# Patient Record
Sex: FEMALE | Race: WHITE | Employment: OTHER | URBAN - NONMETROPOLITAN AREA
[De-identification: names, ages, dates, MRNs, and addresses within clinical notes are randomized per-mention and may not be internally consistent; named-entity substitution may affect disease eponyms.]

---

## 2017-09-28 ENCOUNTER — APPOINTMENT (OUTPATIENT)
Dept: NON INVASIVE DIAGNOSTICS | Age: 72
DRG: 313 | End: 2017-09-28
Attending: ANESTHESIOLOGY
Payer: MEDICARE

## 2017-09-28 ENCOUNTER — HOSPITAL ENCOUNTER (INPATIENT)
Age: 72
LOS: 1 days | Discharge: HOME | DRG: 313 | End: 2017-09-28
Attending: ANESTHESIOLOGY | Admitting: ANESTHESIOLOGY
Payer: MEDICARE

## 2017-09-28 VITALS
OXYGEN SATURATION: 97 % | BODY MASS INDEX: 22.9 KG/M2 | TEMPERATURE: 97.8 F | SYSTOLIC BLOOD PRESSURE: 147 MMHG | DIASTOLIC BLOOD PRESSURE: 66 MMHG | WEIGHT: 113.6 LBS | HEIGHT: 59 IN | HEART RATE: 73 BPM | RESPIRATION RATE: 18 BRPM

## 2017-09-28 PROBLEM — R07.9 CHEST PAIN: Status: ACTIVE | Noted: 2017-09-28

## 2017-09-28 LAB
ALBUMIN SERPL-MCNC: 3.5 G/DL (ref 3.5–5.1)
ALP BLD-CCNC: 80 U/L (ref 38–126)
ALT SERPL-CCNC: 9 U/L (ref 11–66)
ANION GAP SERPL CALCULATED.3IONS-SCNC: 10 MEQ/L (ref 8–16)
APTT: 32.2 SECONDS (ref 22–38)
AST SERPL-CCNC: 13 U/L (ref 5–40)
BASOPHILS # BLD: 0.2 %
BASOPHILS ABSOLUTE: 0 THOU/MM3 (ref 0–0.1)
BILIRUB SERPL-MCNC: 0.3 MG/DL (ref 0.3–1.2)
BUN BLDV-MCNC: 11 MG/DL (ref 7–22)
CALCIUM SERPL-MCNC: 8.6 MG/DL (ref 8.5–10.5)
CHLORIDE BLD-SCNC: 101 MEQ/L (ref 98–111)
CO2: 27 MEQ/L (ref 23–33)
CREAT SERPL-MCNC: 0.4 MG/DL (ref 0.4–1.2)
EOSINOPHIL # BLD: 2.4 %
EOSINOPHILS ABSOLUTE: 0.2 THOU/MM3 (ref 0–0.4)
GFR SERPL CREATININE-BSD FRML MDRD: > 90 ML/MIN/1.73M2
GLUCOSE BLD-MCNC: 116 MG/DL (ref 70–108)
HCT VFR BLD CALC: 35.9 % (ref 37–47)
HEMOGLOBIN: 12.4 GM/DL (ref 12–16)
INR BLD: 0.99 (ref 0.85–1.13)
LACTIC ACID: 1 MMOL/L (ref 0.5–2.2)
LV EF: 60 %
LVEF MODALITY: NORMAL
LYMPHOCYTES # BLD: 27.6 %
LYMPHOCYTES ABSOLUTE: 1.9 THOU/MM3 (ref 1–4.8)
MAGNESIUM: 2.2 MG/DL (ref 1.6–2.4)
MCH RBC QN AUTO: 30.8 PG (ref 27–31)
MCHC RBC AUTO-ENTMCNC: 34.5 GM/DL (ref 33–37)
MCV RBC AUTO: 89.2 FL (ref 81–99)
MONOCYTES # BLD: 7 %
MONOCYTES ABSOLUTE: 0.5 THOU/MM3 (ref 0.4–1.3)
NUCLEATED RED BLOOD CELLS: 0 /100 WBC
PDW BLD-RTO: 13.6 % (ref 11.5–14.5)
PHOSPHORUS: 3.3 MG/DL (ref 2.4–4.7)
PLATELET # BLD: 351 THOU/MM3 (ref 130–400)
PMV BLD AUTO: 7.7 MCM (ref 7.4–10.4)
POTASSIUM SERPL-SCNC: 3.4 MEQ/L (ref 3.5–5.2)
PRO-BNP: 471.1 PG/ML (ref 0–900)
RBC # BLD: 4.03 MILL/MM3 (ref 4.2–5.4)
RBC # BLD: NORMAL 10*6/UL
SEG NEUTROPHILS: 62.8 %
SEGMENTED NEUTROPHILS ABSOLUTE COUNT: 4.3 THOU/MM3 (ref 1.8–7.7)
SODIUM BLD-SCNC: 138 MEQ/L (ref 135–145)
TOTAL PROTEIN: 7.1 G/DL (ref 6.1–8)
TROPONIN T: < 0.01 NG/ML
TROPONIN T: < 0.01 NG/ML
WBC # BLD: 6.8 THOU/MM3 (ref 4.8–10.8)

## 2017-09-28 PROCEDURE — 93306 TTE W/DOPPLER COMPLETE: CPT

## 2017-09-28 PROCEDURE — 2500000003 HC RX 250 WO HCPCS: Performed by: ANESTHESIOLOGY

## 2017-09-28 PROCEDURE — 80053 COMPREHEN METABOLIC PANEL: CPT

## 2017-09-28 PROCEDURE — 6360000002 HC RX W HCPCS: Performed by: ANESTHESIOLOGY

## 2017-09-28 PROCEDURE — 93017 CV STRESS TEST TRACING ONLY: CPT | Performed by: INTERNAL MEDICINE

## 2017-09-28 PROCEDURE — 2580000003 HC RX 258: Performed by: ANESTHESIOLOGY

## 2017-09-28 PROCEDURE — 85610 PROTHROMBIN TIME: CPT

## 2017-09-28 PROCEDURE — 84100 ASSAY OF PHOSPHORUS: CPT

## 2017-09-28 PROCEDURE — 2060000000 HC ICU INTERMEDIATE R&B

## 2017-09-28 PROCEDURE — 83605 ASSAY OF LACTIC ACID: CPT

## 2017-09-28 PROCEDURE — 6370000000 HC RX 637 (ALT 250 FOR IP): Performed by: ANESTHESIOLOGY

## 2017-09-28 PROCEDURE — 83880 ASSAY OF NATRIURETIC PEPTIDE: CPT

## 2017-09-28 PROCEDURE — 84484 ASSAY OF TROPONIN QUANT: CPT

## 2017-09-28 PROCEDURE — 36415 COLL VENOUS BLD VENIPUNCTURE: CPT

## 2017-09-28 PROCEDURE — 6360000002 HC RX W HCPCS

## 2017-09-28 PROCEDURE — 96374 THER/PROPH/DIAG INJ IV PUSH: CPT | Performed by: INTERNAL MEDICINE

## 2017-09-28 PROCEDURE — 93005 ELECTROCARDIOGRAM TRACING: CPT | Performed by: ANESTHESIOLOGY

## 2017-09-28 PROCEDURE — 85025 COMPLETE CBC W/AUTO DIFF WBC: CPT

## 2017-09-28 PROCEDURE — 78452 HT MUSCLE IMAGE SPECT MULT: CPT

## 2017-09-28 PROCEDURE — A9500 TC99M SESTAMIBI: HCPCS | Performed by: HOSPITALIST

## 2017-09-28 PROCEDURE — 99223 1ST HOSP IP/OBS HIGH 75: CPT | Performed by: ANESTHESIOLOGY

## 2017-09-28 PROCEDURE — 85730 THROMBOPLASTIN TIME PARTIAL: CPT

## 2017-09-28 PROCEDURE — 3430000000 HC RX DIAGNOSTIC RADIOPHARMACEUTICAL: Performed by: HOSPITALIST

## 2017-09-28 PROCEDURE — 94640 AIRWAY INHALATION TREATMENT: CPT

## 2017-09-28 PROCEDURE — 99239 HOSP IP/OBS DSCHRG MGMT >30: CPT | Performed by: HOSPITALIST

## 2017-09-28 PROCEDURE — 83735 ASSAY OF MAGNESIUM: CPT

## 2017-09-28 PROCEDURE — 6370000000 HC RX 637 (ALT 250 FOR IP)

## 2017-09-28 RX ORDER — CLOPIDOGREL 300 MG/1
300 TABLET, FILM COATED ORAL ONCE
Status: COMPLETED | OUTPATIENT
Start: 2017-09-28 | End: 2017-09-28

## 2017-09-28 RX ORDER — ONDANSETRON 4 MG/1
4 TABLET, FILM COATED ORAL DAILY PRN
Status: DISCONTINUED | OUTPATIENT
Start: 2017-09-28 | End: 2017-09-28 | Stop reason: HOSPADM

## 2017-09-28 RX ORDER — ONDANSETRON 2 MG/ML
4 INJECTION INTRAMUSCULAR; INTRAVENOUS EVERY 6 HOURS PRN
Status: DISCONTINUED | OUTPATIENT
Start: 2017-09-28 | End: 2017-09-28 | Stop reason: HOSPADM

## 2017-09-28 RX ORDER — HYDROCODONE BITARTRATE AND ACETAMINOPHEN 5; 325 MG/1; MG/1
1 TABLET ORAL EVERY 4 HOURS PRN
Status: DISCONTINUED | OUTPATIENT
Start: 2017-09-28 | End: 2017-09-28 | Stop reason: HOSPADM

## 2017-09-28 RX ORDER — HYDROCHLOROTHIAZIDE 25 MG/1
12.5 TABLET ORAL DAILY
Status: DISCONTINUED | OUTPATIENT
Start: 2017-09-28 | End: 2017-09-28 | Stop reason: HOSPADM

## 2017-09-28 RX ORDER — PRAVASTATIN SODIUM 20 MG
20 TABLET ORAL DAILY
Status: DISCONTINUED | OUTPATIENT
Start: 2017-09-28 | End: 2017-09-28 | Stop reason: HOSPADM

## 2017-09-28 RX ORDER — ASPIRIN 325 MG
325 TABLET ORAL NIGHTLY
Status: DISCONTINUED | OUTPATIENT
Start: 2017-09-28 | End: 2017-09-28 | Stop reason: HOSPADM

## 2017-09-28 RX ORDER — ASPIRIN 325 MG
325 TABLET ORAL NIGHTLY
COMMUNITY
End: 2018-08-06 | Stop reason: ALTCHOICE

## 2017-09-28 RX ORDER — HYDROCHLOROTHIAZIDE 12.5 MG/1
12.5 TABLET ORAL DAILY
COMMUNITY
End: 2018-08-06 | Stop reason: ALTCHOICE

## 2017-09-28 RX ORDER — CLOPIDOGREL BISULFATE 75 MG/1
75 TABLET ORAL DAILY
Status: DISCONTINUED | OUTPATIENT
Start: 2017-09-29 | End: 2017-09-28 | Stop reason: HOSPADM

## 2017-09-28 RX ORDER — MORPHINE SULFATE 2 MG/ML
2 INJECTION, SOLUTION INTRAMUSCULAR; INTRAVENOUS
Status: DISCONTINUED | OUTPATIENT
Start: 2017-09-28 | End: 2017-09-28 | Stop reason: HOSPADM

## 2017-09-28 RX ORDER — METOPROLOL SUCCINATE 100 MG/1
100 TABLET, EXTENDED RELEASE ORAL DAILY
Status: DISCONTINUED | OUTPATIENT
Start: 2017-09-28 | End: 2017-09-28

## 2017-09-28 RX ORDER — METOPROLOL SUCCINATE 50 MG/1
50 TABLET, EXTENDED RELEASE ORAL DAILY
Status: DISCONTINUED | OUTPATIENT
Start: 2017-09-28 | End: 2017-09-28 | Stop reason: HOSPADM

## 2017-09-28 RX ORDER — ZAFIRLUKAST 20 MG/1
20 TABLET, FILM COATED ORAL 2 TIMES DAILY
Status: DISCONTINUED | OUTPATIENT
Start: 2017-09-28 | End: 2017-09-28 | Stop reason: HOSPADM

## 2017-09-28 RX ORDER — AMLODIPINE BESYLATE 5 MG/1
5 TABLET ORAL DAILY
COMMUNITY
End: 2018-08-06 | Stop reason: ALTCHOICE

## 2017-09-28 RX ORDER — SODIUM CHLORIDE 0.9 % (FLUSH) 0.9 %
10 SYRINGE (ML) INJECTION PRN
Status: DISCONTINUED | OUTPATIENT
Start: 2017-09-28 | End: 2017-09-28 | Stop reason: HOSPADM

## 2017-09-28 RX ORDER — NITROGLYCERIN 20 MG/100ML
5 INJECTION INTRAVENOUS CONTINUOUS
Status: DISCONTINUED | OUTPATIENT
Start: 2017-09-28 | End: 2017-09-28 | Stop reason: HOSPADM

## 2017-09-28 RX ORDER — AMLODIPINE BESYLATE 5 MG/1
5 TABLET ORAL DAILY
Status: DISCONTINUED | OUTPATIENT
Start: 2017-09-28 | End: 2017-09-28 | Stop reason: HOSPADM

## 2017-09-28 RX ORDER — ASCORBIC ACID 500 MG
500 TABLET ORAL DAILY
Status: DISCONTINUED | OUTPATIENT
Start: 2017-09-28 | End: 2017-09-28 | Stop reason: HOSPADM

## 2017-09-28 RX ORDER — NITROGLYCERIN 0.4 MG/1
0.4 TABLET SUBLINGUAL EVERY 5 MIN PRN
Status: DISCONTINUED | OUTPATIENT
Start: 2017-09-28 | End: 2017-09-28 | Stop reason: HOSPADM

## 2017-09-28 RX ORDER — BUDESONIDE AND FORMOTEROL FUMARATE DIHYDRATE 80; 4.5 UG/1; UG/1
1 AEROSOL RESPIRATORY (INHALATION) 2 TIMES DAILY
COMMUNITY
End: 2018-08-06 | Stop reason: ALTCHOICE

## 2017-09-28 RX ORDER — ALBUTEROL SULFATE 2.5 MG/3ML
2.5 SOLUTION RESPIRATORY (INHALATION) EVERY 6 HOURS PRN
Status: DISCONTINUED | OUTPATIENT
Start: 2017-09-28 | End: 2017-09-28 | Stop reason: HOSPADM

## 2017-09-28 RX ORDER — MORPHINE SULFATE 4 MG/ML
4 INJECTION, SOLUTION INTRAMUSCULAR; INTRAVENOUS
Status: DISCONTINUED | OUTPATIENT
Start: 2017-09-28 | End: 2017-09-28 | Stop reason: HOSPADM

## 2017-09-28 RX ORDER — ALBUTEROL SULFATE 90 UG/1
2 AEROSOL, METERED RESPIRATORY (INHALATION) EVERY 6 HOURS PRN
Status: DISCONTINUED | OUTPATIENT
Start: 2017-09-28 | End: 2017-09-28 | Stop reason: HOSPADM

## 2017-09-28 RX ORDER — POTASSIUM CHLORIDE 750 MG/1
40 TABLET, FILM COATED, EXTENDED RELEASE ORAL ONCE
Status: COMPLETED | OUTPATIENT
Start: 2017-09-28 | End: 2017-09-28

## 2017-09-28 RX ORDER — FAMOTIDINE 20 MG/1
20 TABLET, FILM COATED ORAL 2 TIMES DAILY
Status: DISCONTINUED | OUTPATIENT
Start: 2017-09-28 | End: 2017-09-28 | Stop reason: HOSPADM

## 2017-09-28 RX ORDER — SODIUM CHLORIDE 0.9 % (FLUSH) 0.9 %
10 SYRINGE (ML) INJECTION EVERY 12 HOURS SCHEDULED
Status: DISCONTINUED | OUTPATIENT
Start: 2017-09-28 | End: 2017-09-28 | Stop reason: HOSPADM

## 2017-09-28 RX ORDER — CYCLOBENZAPRINE HCL 10 MG
5 TABLET ORAL 3 TIMES DAILY PRN
Status: DISCONTINUED | OUTPATIENT
Start: 2017-09-28 | End: 2017-09-28 | Stop reason: HOSPADM

## 2017-09-28 RX ORDER — HYDROCODONE BITARTRATE AND ACETAMINOPHEN 5; 325 MG/1; MG/1
2 TABLET ORAL EVERY 4 HOURS PRN
Status: DISCONTINUED | OUTPATIENT
Start: 2017-09-28 | End: 2017-09-28 | Stop reason: HOSPADM

## 2017-09-28 RX ORDER — ACETAMINOPHEN 325 MG/1
650 TABLET ORAL EVERY 4 HOURS PRN
Status: DISCONTINUED | OUTPATIENT
Start: 2017-09-28 | End: 2017-09-28 | Stop reason: HOSPADM

## 2017-09-28 RX ORDER — ALPRAZOLAM 0.5 MG/1
0.5 TABLET ORAL EVERY 8 HOURS PRN
Status: DISCONTINUED | OUTPATIENT
Start: 2017-09-28 | End: 2017-09-28 | Stop reason: HOSPADM

## 2017-09-28 RX ADMIN — NITROGLYCERIN 5 MCG/MIN: 20 INJECTION INTRAVENOUS at 03:22

## 2017-09-28 RX ADMIN — FAMOTIDINE 20 MG: 20 TABLET, FILM COATED ORAL at 10:39

## 2017-09-28 RX ADMIN — ZAFIRLUKAST 20 MG: 20 TABLET, FILM COATED ORAL at 11:36

## 2017-09-28 RX ADMIN — ENOXAPARIN SODIUM 50 MG: 60 INJECTION SUBCUTANEOUS at 06:14

## 2017-09-28 RX ADMIN — POTASSIUM CHLORIDE 40 MEQ: 750 TABLET, FILM COATED, EXTENDED RELEASE ORAL at 10:39

## 2017-09-28 RX ADMIN — ALBUTEROL SULFATE 2.5 MG: 2.5 SOLUTION RESPIRATORY (INHALATION) at 06:37

## 2017-09-28 RX ADMIN — Medication 10 ML: at 10:41

## 2017-09-28 RX ADMIN — Medication 10 MILLICURIE: at 07:30

## 2017-09-28 RX ADMIN — METOPROLOL SUCCINATE 50 MG: 50 TABLET, FILM COATED, EXTENDED RELEASE ORAL at 10:39

## 2017-09-28 RX ADMIN — AMLODIPINE BESYLATE 5 MG: 5 TABLET ORAL at 10:39

## 2017-09-28 RX ADMIN — HYDROCHLOROTHIAZIDE 12.5 MG: 25 TABLET ORAL at 10:40

## 2017-09-28 RX ADMIN — CLOPIDOGREL BISULFATE 300 MG: 300 TABLET, FILM COATED ORAL at 06:14

## 2017-09-28 RX ADMIN — Medication 34.7 MILLICURIE: at 09:03

## 2017-09-28 RX ADMIN — Medication 500 MG: at 10:39

## 2017-09-28 RX ADMIN — VITAMIN D, TAB 1000IU (100/BT) 1000 UNITS: 25 TAB at 10:39

## 2017-09-28 ASSESSMENT — PAIN SCALES - GENERAL
PAINLEVEL_OUTOF10: 5
PAINLEVEL_OUTOF10: 4
PAINLEVEL_OUTOF10: 5

## 2017-09-28 ASSESSMENT — PAIN DESCRIPTION - PAIN TYPE
TYPE: ACUTE PAIN

## 2017-09-28 ASSESSMENT — PAIN DESCRIPTION - LOCATION
LOCATION: CHEST
LOCATION: CHEST

## 2017-09-28 ASSESSMENT — PAIN DESCRIPTION - ORIENTATION: ORIENTATION: MID

## 2017-09-28 NOTE — PROGRESS NOTES
0015 Patient arrived to [facility] via stretcher with belongings and home medications. Patient complains of dizziness, chest pain, and SOB with exertion. 0.9 saline infusing at [facility] and nitro gtt running at 4mcg/min. Vitals and assessment completed. Peaked T-wave noted on admission. Dr. Opal Carrillo notified and updated. Ordered for EKG. Chest pain is 5/10 right now.

## 2017-09-28 NOTE — IP AVS SNAPSHOT
Name Address Phone       [address] [phone]            Your Visit    Here you will find information about your visit, including the reason for your visit. Please take this sheet with you when you visit your doctor or other health care provider in the future. It will help determine the best possible medical care for you at that time. If you have any questions once you leave the hospital, please call the department phone number listed below. Why you were here     Your primary diagnosis was:  Chest Pain      Visit Information     Date & Time Provider Department Dept. Phone    9/28/2017 Cm Garrison MD [facility] [phone]       Follow-up Appointments    Below is a list of your follow-up and future appointments. This may not be a complete list as you may have made appointments directly with providers that we are not aware of or your providers may have made some for you. Please call your providers to confirm appointments. It is important to keep your appointments. Please bring your current insurance card, photo ID, co-pay, and all medication bottles to your appointment. If self-pay, payment is expected at the time of service. Follow-up Information     Follow up with Alice Curiel MD On 10/3/2017. Specialty:  Internal Medicine    Why:  at 9:50am    Contact information:    [address]  [phone]        Preventive Care        Date Due    Hepatitis C screening is recommended for all adults regardless of risk factors born between [facility] at least once (lifetime) who have never been tested.  1945    Tetanus Combination Vaccine (1 - Tdap) 10/21/1964    Cholesterol Screening 10/21/1985    Mammograms are recommended every 2 years for low/average risk patients aged 48 - 69, and every year for high risk patients per updated national guidelines. However these guidelines can be individualized by your provider. 10/21/1995    Colonoscopy 10/21/1995    Zoster Vaccine 10/21/2005    Osteoporosis screening or a bone density scan (Dexa) is recommended once at age 72. Based upon the results and risk factors for bone loss, your provider will recommend whether this needs to be repeated. 10/21/2010    Pneumococcal Vaccines (two) for all adults aged 72 and over (1 of 2 - PCV13) 10/21/2010    Yearly Flu Vaccine (1) 9/1/2017                 Care Plan Once You Return Home    This section includes instructions you will need to follow once you leave the hospital.  Your care team will discuss these with you, so you and those caring for you know how to best care for your health needs at home. This section may also include educational information about certain health topics that may be of help to you. [employer] Signup     Recommendi allows you to send messages to your doctor, view your test results, renew your prescriptions, schedule appointments, view visit notes, and more. How Do I Sign Up? 1. In your Internet browser, go to [email or web address]  2. Click on the Sign Up Now link in the Sign In box. You will see the New Member Sign Up page. 3. Enter your Recommendi Access Code exactly as it appears below. You will not need to use this code after youve completed the sign-up process. If you do not sign up before the expiration date, you must request a new code. Recommendi Access Code: [email or web address]  Expires: 11/27/2017  3:40 PM    4. Enter your Social Security Number ([id number]) and Date of Birth (mm/dd/yyyy) as indicated and click Submit. You will be taken to the next sign-up page. 5. Create a Recommendi ID. This will be your Recommendi login ID and cannot be changed, so think of one that is secure and easy to remember. 6. Create a Recommendi password. You can change your password at any time.

## 2017-09-28 NOTE — H&P
with  respiratory effort. No use of accessory muscles of respiration. No  respiratory distress. The patient can speak in complete sentences. LUNGS:  Clear to auscultation in all fields. No rales, rhonchi,  wheezing, or crackles. HEART:  PMI is displaced to the left of left  midclavicular line, slightly enlarged. Normal S1. Normal S2. No S3. No S4. No murmurs, gallops, rubs, or clicks heard. ABDOMEN:  Soft. Nondistended. Nontender. No peritoneal signs. No masses felt. Bowel sounds normoactive in all four quadrants. BACK:  No spinal tenderness but some neck tenderness. As stated  before, the pain from the chest is radiating up to back of the neck. No CVA tenderness bilaterally though. EXTREMITIES:  No cyanosis, clubbing, or edema. Radial pulses are  4+/4+ bilaterally. Pedal pulses are 2+/4+ bilaterally. No ankle  edema. No pedal edema. No calf tenderness. No Homans' sign. No  cords felt. No evidence of DVT. NEURO:  Alert and oriented x3. GCS 15. Cranial nerves II-XII are  intact. Motor is 5/5 in all extremities and all muscle groups. Sensory exam is symmetrical and within normal limits. SKIN:  Skin is warm and dry without erythema, without rash. There are  some bruises on the anterior left thigh, left knee, and left calf from  working on the ladder decorating the house. No diaphoresis. No rash. No erythema. PSYCHIATRIC:  Normal mood and affect. Normal thought processes. EKG shows sinus bradycardia with sinus arrhythmia, otherwise, a normal  EKG. No ST-T wave changes indicative of any ischemia. LABORATORY DATA:  CBC showed white blood cell count of 6.8 with a  normal differential.  H and H are down a little bit at 12.2 and 37.6. RBC indices are within normal limits. RDW within normal limits. Platelet count is 091. CMP shows albumin level to be 4.0. Alkaline  phosphatase to be 91. Total bilirubin 0.2. BUN to be 12.   Creatinine  to be less than 0.5 with estimated GFR greater than 60. Calcium level  8.7. Glucose level 112. Total protein 7.3. AST is 15. ALT is 11. Chloride is 100. Bicarb 24.7 which is down a little bit. Potassium  level of 3.8. Sodium level of 137. D-dimer assay is negative at  0.36. Troponin T is less than 0.01, which is negative. ASSESSMENT AND PLAN:  1. Chest pain:  There is very much typical of cardiac chest pain, but  she has not bumped her troponins yet. We will run a series of  troponins to check to make sure she did not bump. If she does not  bump, we will get a Lexiscan on her later this morning to find out  what is going on. Chest pain seems to be relieved with nitroglycerin  drip. We will keep using the nitroglycerin drip titrated to chest  pain free. We will continue her aspirin 325 mg or Plavix, we will put  her on with a loading dose and high dose enoxaparin. 2.  GI prophylaxis with Pepcid 20 mg twice daily. 3.  High blood pressure. Continue her hydrochlorothiazide and her  Norvasc and her metoprolol. 4.  Continue her metoprolol for heart. 5.  I have put her on Briscoe for asthma to help out better. 6.  Hyperlipidemia. Continue her pravastatin. 7.  Continue her zafirlukast or Accolade for her asthma. 8.  Continue her vitamin D and her vitamin C for supplementation and  put her on electrolyte replacement protocols. I spent 70 minutes evaluating and managing the patient including  review of records, documentation, and examination of the patient. This 70 minutes of hospitalist time not including time spent  performing procedures as none were performed.         Bryanna Correa M.D.    D: 09/28/2017 4:09:06       T: 09/28/2017 4:19:45     [email or web address]  Job#: [id number]     Doc#: [id number]

## 2017-09-28 NOTE — FLOWSHEET NOTE
Advanced Directives document and information given. Prayer was appreciated. 09/28/17 1504   Encounter Summary   Services provided to: Patient   Referral/Consult From: [address] [facility] Family members   Continue Visiting Yes  (9/28 )   Complexity of Encounter Low   Length of Encounter 15 minutes   Routine   Type Initial   Assessment Approachable;Calm; Hopeful   Intervention [place];Prayer;Nurtured hope; Active listening   Outcome Acceptance;Expressed gratitude;Encouraged; Hopeful;Receptive

## 2017-09-28 NOTE — DISCHARGE SUMMARY
Discharge Summary    Patient:  Kathy Peraza  YOB: 1945    MRN: [id number]   Acct: [de-identified]    Primary Care Physician: Pau Pillai MD    Admit date:  9/28/2017    Discharge date:   9/28/2017     Discharge Diagnoses:   Chest pain  Principal Problem:    Chest pain      Hospital Course:  70year old admitted from [address] [place] [address] for chest pain partially relieved with Nitro. She had negative serial troponins and was monitored overnight. She had a negative lexiscan test and is to follow up with her PCP for echo results. She does admit to a history of GERD usually with her bronchitis episodes and admits she has been hoarse lately. She was recommended for PPI trial with her PCP. Consultants:  Patient Care Team:  Deejay Castellon MD as PCP - General    Discharge Medications:     Medication List      CONTINUE taking these medications          * albuterol (2.5 MG/3ML) 0.083% nebulizer solution   Commonly known as:  PROVENTIL       * albuterol sulfate  (90 Base) MCG/ACT inhaler       ALPRAZolam 0.5 MG tablet   Commonly known as:  XANAX       amLODIPine 5 MG tablet   Commonly known as:  NORVASC       aspirin 325 MG tablet       hydrochlorothiazide 12.5 MG tablet   Commonly known as:  HYDRODIURIL       metoprolol succinate 50 MG extended release tablet   Commonly known as:  TOPROL XL       SYMBICORT 80-4.5 MCG/ACT Aero   Generic drug:  budesonide-formoterol       vitamin C 250 MG tablet       Vitamin D 1000 units Caps capsule   Commonly known as:  CHOLECALCIFEROL       zafirlukast 20 MG tablet   Commonly known as:  ACCOLATE       * Notice: This list has 2 medication(s) that are the same as other medications prescribed for you. Read the directions carefully, and ask your doctor or other care provider to review them with you.       STOP taking these medications          pravastatin 20 MG tablet   Commonly known as:  PRAVACHOL               Physical Exam:    Vitals:  Vitals:    09/28/17 0030 09/28/17 0321 09/28/17 1029 09/28/17 1114   BP: (!) 136/55 (!) 113/54 (!) 144/65 (!) 147/66   Pulse: 71 61 72 73   Resp: 20 16 20 18   Temp: 98.7 °F (37.1 °C) 97.6 °F (36.4 °C) 98.2 °F (36.8 °C) 97.8 °F (36.6 °C)   TempSrc: Oral Oral Oral Oral   SpO2: 97% 95% 96% 97%   Weight: 113 lb 9.6 oz (51.5 kg)      Height: 4' 11\" (1.499 m)        Weight: Weight: 113 lb 9.6 oz (51.5 kg)     24 hour intake/output:  Intake/Output Summary (Last 24 hours) at 09/28/17 1338  Last data filed at 09/28/17 1258   Gross per 24 hour   Intake              440 ml   Output                0 ml   Net              440 ml       General appearance - alert awake appears to be in no acute distress  Chest - Bilateral air entry, no wheeze  Heart - S1S2 RRR, no murmurs or gallops  Abdomen - soft, non tender, normoactive bowel sounds  Neurological - non focal  Extremities - no edema  Skin - no rashes or lesions    Procedures:  na    Diagnostic Test:  Nuclear stress test    Radiology reports as per the Radiologist  Radiology:  No results found.     Results for orders placed or performed during the hospital encounter of 09/28/17   Comprehensive metabolic panel   Result Value Ref Range    Glucose 116 (H) 70 - 108 mg/dL    CREATININE 0.4 0.4 - 1.2 mg/dL    BUN 11 7 - 22 mg/dL    Sodium 138 135 - 145 meq/L    Potassium 3.4 (L) 3.5 - 5.2 meq/L    Chloride 101 98 - 111 meq/L    CO2 27 23 - 33 meq/L    Calcium 8.6 8.5 - 10.5 mg/dL    AST 13 5 - 40 U/L    Alkaline Phosphatase 80 38 - 126 U/L    Total Protein 7.1 6.1 - 8.0 g/dL    Alb 3.5 3.5 - 5.1 g/dL    Total Bilirubin 0.3 0.3 - 1.2 mg/dL    ALT 9 (L) 11 - 66 U/L   Lactic acid, plasma   Result Value Ref Range    Lactic Acid 1.0 0.5 - 2.2 mmol/L   Magnesium   Result Value Ref Range    Magnesium 2.2 1.6 - 2.4 mg/dL   Troponin   Result Value Ref Range    Troponin T < 0.010 ng/ml   Troponin   Result Value Ref Range    Troponin T < 0.010 ng/ml   Brain natriuretic peptide Result Value Ref Range    Pro-.1 0.0 - 900.0 pg/mL   CBC auto differential   Result Value Ref Range    WBC 6.8 4.8 - 10.8 thou/mm3    RBC 4.03 (L) 4.20 - 5.40 mill/mm3    Hemoglobin 12.4 12.0 - 16.0 gm/dl    Hematocrit 35.9 (L) 37.0 - 47.0 %    MCV 89.2 81.0 - 99.0 fL    MCH 30.8 27.0 - 31.0 pg    MCHC 34.5 33.0 - 37.0 gm/dl    RDW 13.6 11.5 - 14.5 %    Platelets 669 222 - 236 thou/mm3    MPV 7.7 7.4 - 10.4 mcm    RBC Morphology NORMAL     Seg Neutrophils 62.8 %    Lymphocytes 27.6 %    Monocytes 7.0 %    Eosinophils 2.4 %    Basophils 0.2 %    nRBC 0 /100 wbc    Segs Absolute 4.3 1.8 - 7.7 thou/mm3    Lymphocytes # 1.9 1.0 - 4.8 thou/mm3    Monocytes # 0.5 0.4 - 1.3 thou/mm3    Eosinophils # 0.2 0.0 - 0.4 thou/mm3    Basophils # 0.0 0.0 - 0.1 thou/mm3   Protime-INR   Result Value Ref Range    INR 0.99 0.85 - 1.13   APTT   Result Value Ref Range    aPTT 32.2 22.0 - 38.0 seconds   Phosphorus   Result Value Ref Range    Phosphorus 3.3 2.4 - 4.7 mg/dL   Anion Gap   Result Value Ref Range    Anion Gap 10.0 8.0 - 16.0 meq/L   Glomerular Filtration Rate, Estimated   Result Value Ref Range    Est, Glom Filt Rate >90 ml/min/1.73m2   EKG 12 Lead   Result Value Ref Range    Ventricular Rate 55 BPM    Atrial Rate 55 BPM    P-R Interval 140 ms    QRS Duration 86 ms    Q-T Interval 440 ms    QTc Calculation (Bazett) 420 ms    P Axis 78 degrees    R Axis -8 degrees    T Axis 67 degrees       Diet:  DIET CARDIAC;     Activity:  Activity as tolerated (Patient may move about with assist as indicated or with supervision.)    Follow-up:  in the next few days with Pau Pillai MD    Disposition: home    Condition: Stable      Time Spent: 35 minutes    Electronically signed by Brayan Tirado MD on 9/28/2017 at 1:38 PM    Discharging Hospitalist

## 2017-09-28 NOTE — PROGRESS NOTES
Pharmacy Medication History Note      List of current medications patient is taking is complete. Source of information: Patient and RxNT    Changes made to medication list:  Medications removed (include reason, ex. therapy complete or physician discontinued): · Flexeril (therapy completed)  · Advair (alternate therapy)  · Vicodin (therapy completed)  · Zofran (therapy completed)  · Pravastatin (patient choice)    Medications added/doses adjusted:  · Added Symbicort 80-4,5 mcg/act 1 puff BID  · Changed Vitamin D 1000 units 5 times daily to Vitamin D 1000 units daily    Other notes (ex. Recent course of antibiotics, Coumadin dosing):  · Denies use of other OTC or herbal medications.       Allergies reviewed      Electronically signed by Vicky Palmer, [facility] on 9/28/2017 at 11:29 AM

## 2017-09-28 NOTE — PROGRESS NOTES
CLINICAL PHARMACY: DISCHARGE MED RECONCILIATION/REVIEW    [facility] ([facility] Select Patient?: No  Total # of Interventions Recommended: 0   - Increased Dose #: 0  Total # Interventions Accepted: 0  Intervention Severity:   - Level 1 Intervention Present?: No   - Level 2 #: 0   - Level 3 #: 0   Time Spent (min): 5    Simeon Lama PharmD  9/28/2017 3:15 PM

## 2017-09-28 NOTE — PROGRESS NOTES
Discharge instructions reviewed with patient and patient verbalizes understanding. Follow up appointments and current medications reviewed with patient and she verbalizes understanding. Telemetry monitor and wires removed and cleansed. Telemetry monitor and wires placed at nurses station in [facility]. Patient discharged with personal belongings. Patient taken to main lobby for discharge.

## 2017-09-29 LAB
EKG ATRIAL RATE: 55 BPM
EKG P AXIS: 78 DEGREES
EKG P-R INTERVAL: 140 MS
EKG Q-T INTERVAL: 440 MS
EKG QRS DURATION: 86 MS
EKG QTC CALCULATION (BAZETT): 420 MS
EKG R AXIS: -8 DEGREES
EKG T AXIS: 67 DEGREES
EKG VENTRICULAR RATE: 55 BPM

## 2018-08-13 ENCOUNTER — ANESTHESIA EVENT (OUTPATIENT)
Dept: OPERATING ROOM | Age: 73
End: 2018-08-13
Payer: MEDICARE

## 2018-08-14 ENCOUNTER — ANESTHESIA (OUTPATIENT)
Dept: OPERATING ROOM | Age: 73
End: 2018-08-14
Payer: MEDICARE

## 2018-08-14 ENCOUNTER — HOSPITAL ENCOUNTER (OUTPATIENT)
Age: 73
Setting detail: OUTPATIENT SURGERY
Discharge: HOME | End: 2018-08-14
Attending: OPHTHALMOLOGY | Admitting: OPHTHALMOLOGY
Payer: MEDICARE

## 2018-08-14 VITALS
BODY MASS INDEX: 19.76 KG/M2 | OXYGEN SATURATION: 94 % | RESPIRATION RATE: 16 BRPM | TEMPERATURE: 97.5 F | HEART RATE: 80 BPM | DIASTOLIC BLOOD PRESSURE: 48 MMHG | HEIGHT: 59 IN | SYSTOLIC BLOOD PRESSURE: 114 MMHG | WEIGHT: 98 LBS

## 2018-08-14 VITALS — OXYGEN SATURATION: 99 % | DIASTOLIC BLOOD PRESSURE: 52 MMHG | SYSTOLIC BLOOD PRESSURE: 114 MMHG

## 2018-08-14 PROBLEM — H43.821 VITREOMACULAR TRACTION SYNDROME OF RIGHT EYE: Status: ACTIVE | Noted: 2018-08-14

## 2018-08-14 PROBLEM — H35.81 MACULAR EDEMA: Status: ACTIVE | Noted: 2018-08-14

## 2018-08-14 LAB
GFR NON-AFRICAN AMERICAN: >60 ML/MIN
GFR SERPL CREATININE-BSD FRML MDRD: >60 ML/MIN
GFR SERPL CREATININE-BSD FRML MDRD: NORMAL ML/MIN/{1.73_M2}
GLUCOSE BLD-MCNC: 94 MG/DL (ref 74–100)
POC CREATININE: 0.86 MG/DL (ref 0.51–1.19)
POC POTASSIUM: 4.1 MMOL/L (ref 3.5–4.5)

## 2018-08-14 PROCEDURE — 2709999900 HC NON-CHARGEABLE SUPPLY: Performed by: OPHTHALMOLOGY

## 2018-08-14 PROCEDURE — 2500000003 HC RX 250 WO HCPCS: Performed by: OPHTHALMOLOGY

## 2018-08-14 PROCEDURE — 3700000001 HC ADD 15 MINUTES (ANESTHESIA): Performed by: OPHTHALMOLOGY

## 2018-08-14 PROCEDURE — 7100000040 HC SPAR PHASE II RECOVERY - FIRST 15 MIN: Performed by: OPHTHALMOLOGY

## 2018-08-14 PROCEDURE — 2580000003 HC RX 258: Performed by: OPHTHALMOLOGY

## 2018-08-14 PROCEDURE — 2580000003 HC RX 258: Performed by: ANESTHESIOLOGY

## 2018-08-14 PROCEDURE — 3600000014 HC SURGERY LEVEL 4 ADDTL 15MIN: Performed by: OPHTHALMOLOGY

## 2018-08-14 PROCEDURE — 6370000000 HC RX 637 (ALT 250 FOR IP): Performed by: OPHTHALMOLOGY

## 2018-08-14 PROCEDURE — 3600000004 HC SURGERY LEVEL 4 BASE: Performed by: OPHTHALMOLOGY

## 2018-08-14 PROCEDURE — 82565 ASSAY OF CREATININE: CPT

## 2018-08-14 PROCEDURE — 6360000002 HC RX W HCPCS: Performed by: OPHTHALMOLOGY

## 2018-08-14 PROCEDURE — 6360000002 HC RX W HCPCS: Performed by: NURSE ANESTHETIST, CERTIFIED REGISTERED

## 2018-08-14 PROCEDURE — 7100000041 HC SPAR PHASE II RECOVERY - ADDTL 15 MIN: Performed by: OPHTHALMOLOGY

## 2018-08-14 PROCEDURE — 6370000000 HC RX 637 (ALT 250 FOR IP): Performed by: ANESTHESIOLOGY

## 2018-08-14 PROCEDURE — 3700000000 HC ANESTHESIA ATTENDED CARE: Performed by: OPHTHALMOLOGY

## 2018-08-14 PROCEDURE — 84132 ASSAY OF SERUM POTASSIUM: CPT

## 2018-08-14 PROCEDURE — 82947 ASSAY GLUCOSE BLOOD QUANT: CPT

## 2018-08-14 PROCEDURE — 2580000003 HC RX 258: Performed by: NURSE ANESTHETIST, CERTIFIED REGISTERED

## 2018-08-14 PROCEDURE — 2720000010 HC SURG SUPPLY STERILE: Performed by: OPHTHALMOLOGY

## 2018-08-14 PROCEDURE — 2500000003 HC RX 250 WO HCPCS: Performed by: NURSE ANESTHETIST, CERTIFIED REGISTERED

## 2018-08-14 RX ORDER — CEFAZOLIN SODIUM 500 MG/2.2ML
INJECTION, POWDER, FOR SOLUTION INTRAMUSCULAR; INTRAVENOUS PRN
Status: DISCONTINUED | OUTPATIENT
Start: 2018-08-14 | End: 2018-08-14 | Stop reason: HOSPADM

## 2018-08-14 RX ORDER — CYCLOPENTOLATE HYDROCHLORIDE 10 MG/ML
1 SOLUTION/ DROPS OPHTHALMIC EVERY 5 MIN PRN
Status: COMPLETED | OUTPATIENT
Start: 2018-08-14 | End: 2018-08-14

## 2018-08-14 RX ORDER — ACETAMINOPHEN 325 MG/1
650 TABLET ORAL EVERY 4 HOURS PRN
Status: DISCONTINUED | OUTPATIENT
Start: 2018-08-14 | End: 2018-08-14 | Stop reason: HOSPADM

## 2018-08-14 RX ORDER — FENTANYL CITRATE 50 UG/ML
25 INJECTION, SOLUTION INTRAMUSCULAR; INTRAVENOUS EVERY 5 MIN PRN
Status: DISCONTINUED | OUTPATIENT
Start: 2018-08-14 | End: 2018-08-14 | Stop reason: HOSPADM

## 2018-08-14 RX ORDER — LIDOCAINE HYDROCHLORIDE 10 MG/ML
INJECTION, SOLUTION INFILTRATION; PERINEURAL PRN
Status: DISCONTINUED | OUTPATIENT
Start: 2018-08-14 | End: 2018-08-14 | Stop reason: SDUPTHER

## 2018-08-14 RX ORDER — LABETALOL HYDROCHLORIDE 5 MG/ML
5 INJECTION, SOLUTION INTRAVENOUS EVERY 10 MIN PRN
Status: DISCONTINUED | OUTPATIENT
Start: 2018-08-14 | End: 2018-08-14 | Stop reason: HOSPADM

## 2018-08-14 RX ORDER — MIDAZOLAM HYDROCHLORIDE 1 MG/ML
1 INJECTION INTRAMUSCULAR; INTRAVENOUS
Status: DISCONTINUED | OUTPATIENT
Start: 2018-08-14 | End: 2018-08-14 | Stop reason: HOSPADM

## 2018-08-14 RX ORDER — ERYTHROMYCIN 5 MG/G
OINTMENT OPHTHALMIC PRN
Status: DISCONTINUED | OUTPATIENT
Start: 2018-08-14 | End: 2018-08-14 | Stop reason: HOSPADM

## 2018-08-14 RX ORDER — BALANCED SALT SOLUTION ENRICHED WITH BICARBONATE, DEXTROSE, AND GLUTATHIONE
KIT INTRAOCULAR PRN
Status: DISCONTINUED | OUTPATIENT
Start: 2018-08-14 | End: 2018-08-14 | Stop reason: HOSPADM

## 2018-08-14 RX ORDER — LIDOCAINE HYDROCHLORIDE 20 MG/ML
INJECTION, SOLUTION INFILTRATION; PERINEURAL PRN
Status: DISCONTINUED | OUTPATIENT
Start: 2018-08-14 | End: 2018-08-14 | Stop reason: HOSPADM

## 2018-08-14 RX ORDER — SODIUM CHLORIDE, SODIUM LACTATE, POTASSIUM CHLORIDE, CALCIUM CHLORIDE 600; 310; 30; 20 MG/100ML; MG/100ML; MG/100ML; MG/100ML
INJECTION, SOLUTION INTRAVENOUS CONTINUOUS PRN
Status: DISCONTINUED | OUTPATIENT
Start: 2018-08-14 | End: 2018-08-14 | Stop reason: SDUPTHER

## 2018-08-14 RX ORDER — BUPIVACAINE HYDROCHLORIDE 7.5 MG/ML
INJECTION, SOLUTION EPIDURAL; RETROBULBAR PRN
Status: DISCONTINUED | OUTPATIENT
Start: 2018-08-14 | End: 2018-08-14 | Stop reason: HOSPADM

## 2018-08-14 RX ORDER — SODIUM CHLORIDE, SODIUM LACTATE, POTASSIUM CHLORIDE, CALCIUM CHLORIDE 600; 310; 30; 20 MG/100ML; MG/100ML; MG/100ML; MG/100ML
INJECTION, SOLUTION INTRAVENOUS CONTINUOUS
Status: DISCONTINUED | OUTPATIENT
Start: 2018-08-14 | End: 2018-08-14 | Stop reason: HOSPADM

## 2018-08-14 RX ORDER — ONDANSETRON 2 MG/ML
4 INJECTION INTRAMUSCULAR; INTRAVENOUS
Status: DISCONTINUED | OUTPATIENT
Start: 2018-08-14 | End: 2018-08-14 | Stop reason: HOSPADM

## 2018-08-14 RX ORDER — LIDOCAINE HYDROCHLORIDE 10 MG/ML
1 INJECTION, SOLUTION EPIDURAL; INFILTRATION; INTRACAUDAL; PERINEURAL
Status: DISCONTINUED | OUTPATIENT
Start: 2018-08-14 | End: 2018-08-14 | Stop reason: HOSPADM

## 2018-08-14 RX ORDER — 0.9 % SODIUM CHLORIDE 0.9 %
VIAL (ML) INJECTION PRN
Status: DISCONTINUED | OUTPATIENT
Start: 2018-08-14 | End: 2018-08-14 | Stop reason: HOSPADM

## 2018-08-14 RX ORDER — PROPOFOL 10 MG/ML
INJECTION, EMULSION INTRAVENOUS PRN
Status: DISCONTINUED | OUTPATIENT
Start: 2018-08-14 | End: 2018-08-14 | Stop reason: SDUPTHER

## 2018-08-14 RX ORDER — PHENYLEPHRINE HYDROCHLORIDE 100 MG/ML
1 SOLUTION/ DROPS OPHTHALMIC EVERY 5 MIN PRN
Status: COMPLETED | OUTPATIENT
Start: 2018-08-14 | End: 2018-08-14

## 2018-08-14 RX ORDER — TETRACAINE HYDROCHLORIDE 5 MG/ML
SOLUTION OPHTHALMIC PRN
Status: DISCONTINUED | OUTPATIENT
Start: 2018-08-14 | End: 2018-08-14 | Stop reason: HOSPADM

## 2018-08-14 RX ADMIN — PROPOFOL 20 MG: 10 INJECTION, EMULSION INTRAVENOUS at 10:49

## 2018-08-14 RX ADMIN — SODIUM CHLORIDE, POTASSIUM CHLORIDE, SODIUM LACTATE AND CALCIUM CHLORIDE: 600; 310; 30; 20 INJECTION, SOLUTION INTRAVENOUS at 09:34

## 2018-08-14 RX ADMIN — CYCLOPENTOLATE HYDROCHLORIDE 1 DROP: 10 SOLUTION/ DROPS OPHTHALMIC at 09:46

## 2018-08-14 RX ADMIN — PROPOFOL 70 MG: 10 INJECTION, EMULSION INTRAVENOUS at 10:48

## 2018-08-14 RX ADMIN — SODIUM CHLORIDE, POTASSIUM CHLORIDE, SODIUM LACTATE AND CALCIUM CHLORIDE: 600; 310; 30; 20 INJECTION, SOLUTION INTRAVENOUS at 10:25

## 2018-08-14 RX ADMIN — PHENYLEPHRINE HYDROCHLORIDE 1 DROP: 100 SOLUTION/ DROPS OPHTHALMIC at 09:46

## 2018-08-14 RX ADMIN — LIDOCAINE HYDROCHLORIDE 25 MG: 10 INJECTION, SOLUTION INFILTRATION; PERINEURAL at 10:49

## 2018-08-14 RX ADMIN — CYCLOPENTOLATE HYDROCHLORIDE 1 DROP: 10 SOLUTION/ DROPS OPHTHALMIC at 09:34

## 2018-08-14 RX ADMIN — PHENYLEPHRINE HYDROCHLORIDE 1 DROP: 100 SOLUTION/ DROPS OPHTHALMIC at 09:34

## 2018-08-14 RX ADMIN — ACETAMINOPHEN 650 MG: 325 TABLET ORAL at 12:25

## 2018-08-14 RX ADMIN — CYCLOPENTOLATE HYDROCHLORIDE 1 DROP: 10 SOLUTION/ DROPS OPHTHALMIC at 09:08

## 2018-08-14 RX ADMIN — GENTAMICIN, PREDNISOLONE ACETATE 1 DROP: 3; 10 SUSPENSION/ DROPS OPHTHALMIC at 09:46

## 2018-08-14 RX ADMIN — PHENYLEPHRINE HYDROCHLORIDE 1 DROP: 100 SOLUTION/ DROPS OPHTHALMIC at 09:08

## 2018-08-14 ASSESSMENT — PAIN SCALES - GENERAL
PAINLEVEL_OUTOF10: 0
PAINLEVEL_OUTOF10: 3
PAINLEVEL_OUTOF10: 0

## 2018-08-14 ASSESSMENT — PULMONARY FUNCTION TESTS
PIF_VALUE: 1

## 2018-08-14 ASSESSMENT — PAIN - FUNCTIONAL ASSESSMENT: PAIN_FUNCTIONAL_ASSESSMENT: 0-10

## 2018-08-14 NOTE — BRIEF OP NOTE
Brief Postoperative Note  ______________________________________________________________    Patient: Mary Bain  YOB: 1945  MRN: [id number]  Date of Procedure: 8/14/2018    Pre-Op Diagnosis: RIGHT EYE VITREOMACULAR TRACTION    Post-Op Diagnosis: Same       Procedure(s):  VITRECTOMY 23 GAUGE, MEMBRANE PEELING    Anesthesia: General, Monitor Anesthesia Care    Surgeon(s):  Vivi Galeana MD    Staff:  Scrub Person First: Michael Bernardo  Scrub Person Second: Jody Wadsworth     Estimated Blood Loss: * No values recorded between 8/14/2018 10:37 AM and 8/14/2018 11:19 AM * None    Complications: None    Specimens:   * No specimens in log *    Implants:  * No implants in log *      Drains:      Findings: Joann Short MD  Date: 8/14/2018  Time: 12:06 PM

## 2018-08-14 NOTE — ANESTHESIA PRE PROCEDURE
drop at 08/14/18 0908    phenylephrine (PRUDENCE-SYNEPHRINE) 10 % ophthalmic solution 1 drop  1 drop Right Eye Q5 Min PRN Cornel Cha MD   1 drop at 08/14/18 0908    lactated ringers infusion   Intravenous Continuous Kwasi Lomeli MD        lidocaine PF 1 % injection 1 mL  1 mL Intradermal Once PRN Kwasi Lomeli MD           Allergies: Allergies   Allergen Reactions    Codeine Other (See Comments)     TYLENOL WITH CODEINE, HALLUCINATIONS    Levofloxacin Anaphylaxis    Statins Nausea And Vomiting and Other (See Comments)     HEADACHES       Problem List:    Patient Active Problem List   Diagnosis Code    Vomiting R11.10    Dizziness R42    Chest pain R07.9       Past Medical History:        Diagnosis Date    Arthritis     Asthma     Atrial fibrillation ([address]     CAD (coronary artery disease) 10/6/2017/ 2/9/2018    MI,CABG BYPASS X2./  MITRAL AND AORTIC VALVES REPLACED. 02/16/2018 PACEMAKER PLACED    Cerebral artery occlusion with cerebral infarction ([address]     PATIENT UNSURE OF WHEN TIA OCCURRED    Difficulty reading     RIGHT EYE    Flashing lights seen     RIGHT EYE    H/O viral illness     ROTOVIRUS  POST OP    History of blood transfusion     NO REACTION    Hx of blood clots 1995    LEFT LEG AFTER KNEE SURGERY    Hyperlipidemia     Hypertension     Pneumonia     X2 BETWEEN HEART SURGERIES    Spinal stenosis     TIA (transient ischemic attack)     Vision disturbance     RIGHT EYE DARK BLURRY, DISTORTEDFROM TEMPLE TO NASAL QUADRANT.         Past Surgical History:        Procedure Laterality Date    ABLATION OF DYSRHYTHMIC FOCUS      BREAST LUMPECTOMY Left 1965    CARDIAC SURGERY  10/06/2017    CABG WITH BYPASS X2    CARDIAC SURGERY      CARDIAC ABLATION X3    CARDIAC VALVE SURGERY  02/09/2018    MITRAL AND AORTIC VALVES, ANNULOPLASTY, DURABAND    COLONOSCOPY      HYSTERECTOMY  1978    COMPLETE RADICAL    KNEE GANGLION SURGERY Left 1995    PACEMAKER PLACEMENT  02/16/2018    [employer] SCIENTIFIC RIGHT CHEST MODEL # [id number]    SIGMOIDOSCOPY      TONSILLECTOMY      AS A CHILD       Social History:    Social History   Substance Use Topics    Smoking status: Passive Smoke Exposure - Never Smoker    Smokeless tobacco: Never Used      Comment: AS MOVED, NO LONGER PASSIVE SMOKE EXPOSURE.  Alcohol use No                                Counseling given: Not Answered      Vital Signs (Current):   Vitals:    08/14/18 0911   BP: (!) 124/55   Pulse: 84   Resp: 24   Temp: 98.2 °F (36.8 °C)   TempSrc: Temporal   SpO2: 97%   Weight: 98 lb (44.5 kg)   Height: 4' 11\" (1.499 m)                                              BP Readings from Last 3 Encounters:   08/14/18 (!) 124/55   11/08/17 (!) 152/65   09/28/17 (!) 147/66       NPO Status: Time of last liquid consumption: 2200                        Time of last solid consumption: 2200                        Date of last liquid consumption: 08/13/18                        Date of last solid food consumption: 08/13/18    BMI:   Wt Readings from Last 3 Encounters:   08/14/18 98 lb (44.5 kg)   11/08/17 107 lb 4.8 oz (48.7 kg)   09/28/17 113 lb 9.6 oz (51.5 kg)     Body mass index is 19.79 kg/m². CBC:   Lab Results   Component Value Date    WBC 6.8 09/28/2017    RBC 4.03 09/28/2017    HGB 12.4 09/28/2017    HCT 35.9 09/28/2017    MCV 89.2 09/28/2017    RDW 13.6 09/28/2017     09/28/2017       CMP:   Lab Results   Component Value Date     09/28/2017    K 3.4 09/28/2017     09/28/2017    CO2 27 09/28/2017    BUN 11 09/28/2017    CREATININE 0.4 09/28/2017    LABGLOM >90 09/28/2017    GLUCOSE 116 09/28/2017    PROT 7.1 09/28/2017    CALCIUM 8.6 09/28/2017    BILITOT 0.3 09/28/2017    ALKPHOS 80 09/28/2017    AST 13 09/28/2017    ALT 9 09/28/2017       POC Tests: No results for input(s): POCGLU, POCNA, POCK, POCCL, POCBUN, POCHEMO, POCHCT in the last 72 hours.     Coags:   Lab Results   Component Value Date    INR 0.99 09/28/2017    APTT 32.2

## (undated) DEVICE — 6 ML SYRINGE LUER-LOCK TIP: Brand: MONOJECT

## (undated) DEVICE — SURGICAL PROCEDURE PACK 23 GA VITRECTOMY

## (undated) DEVICE — AGENT VISCOELASTIC 1ML 2% HYDROXYPROPYL METHCELL PRELD GLS

## (undated) DEVICE — HYPODERMIC SAFETY NEEDLE: Brand: MAGELLAN

## (undated) DEVICE — 23GA RETRACTABLE ILM MEMBRANE ELEVATOR BOX OF 5: Brand: VORTEX SURGICAL INC

## (undated) DEVICE — SYRINGE, LUER LOCK, 10ML: Brand: MEDLINE

## (undated) DEVICE — 23 GA FLEXIBLE TIP LASER PROBE: Brand: ALCON, ENGAUGE

## (undated) DEVICE — RETINA PK

## (undated) DEVICE — NEEDLE FLTR 18GA L1.5IN MEM THK5UM BLNT DISP

## (undated) DEVICE — 23GA EZPASS SOFT TIP CANNULA BOX OF 5: Brand: VORTEX SURGICAL INC

## (undated) DEVICE — STANDARD HYPODERMIC NEEDLE,ALUMINUM HUB: Brand: MONOJECT

## (undated) DEVICE — 60 ML SYRINGE LUER-LOCK TIP: Brand: MONOJECT

## (undated) DEVICE — SOLUTION IRRIG 1000ML PENTALYTE PLAS POUR BTL TIS U SOL

## (undated) DEVICE — 1 EACH 40411 STERILE DISPOSABLE SUPER VIEW® LENS SET & 1 EACH 40100 STERILE MICROSCOPE DRAPE: Brand: SUPER VIEW® PACK

## (undated) DEVICE — 23GA ACTU8 ADAPTIVE FORCEPS BOX OF 5: Brand: VORTEX SURGICAL INC